# Patient Record
Sex: FEMALE | Employment: PART TIME | URBAN - METROPOLITAN AREA
[De-identification: names, ages, dates, MRNs, and addresses within clinical notes are randomized per-mention and may not be internally consistent; named-entity substitution may affect disease eponyms.]

---

## 2019-10-02 ENCOUNTER — OFFICE VISIT (OUTPATIENT)
Dept: PODIATRY | Facility: CLINIC | Age: 59
End: 2019-10-02
Payer: COMMERCIAL

## 2019-10-02 VITALS
SYSTOLIC BLOOD PRESSURE: 156 MMHG | WEIGHT: 147 LBS | HEIGHT: 63 IN | DIASTOLIC BLOOD PRESSURE: 95 MMHG | BODY MASS INDEX: 26.05 KG/M2 | HEART RATE: 68 BPM

## 2019-10-02 DIAGNOSIS — L84 CALLUS OF FOOT: Primary | ICD-10-CM

## 2019-10-02 DIAGNOSIS — M25.572 SINUS TARSI SYNDROME, LEFT: ICD-10-CM

## 2019-10-02 PROCEDURE — 99203 OFFICE O/P NEW LOW 30 MIN: CPT | Mod: 25 | Performed by: PODIATRIST

## 2019-10-02 PROCEDURE — 11055 PARING/CUTG B9 HYPRKER LES 1: CPT | Performed by: PODIATRIST

## 2019-10-02 RX ORDER — CHLORTHALIDONE 25 MG/1
25 TABLET ORAL EVERY MORNING
Refills: 3 | COMMUNITY
Start: 2019-07-10

## 2019-10-02 RX ORDER — CHLORAL HYDRATE 500 MG
2000 CAPSULE ORAL
COMMUNITY
Start: 2017-05-01

## 2019-10-02 RX ORDER — ATORVASTATIN CALCIUM 40 MG/1
40 TABLET, FILM COATED ORAL AT BEDTIME
Refills: 1 | COMMUNITY
Start: 2019-07-10

## 2019-10-02 RX ORDER — IBUPROFEN 200 MG
200 TABLET ORAL
COMMUNITY
Start: 2017-04-18

## 2019-10-02 RX ORDER — ATENOLOL 100 MG/1
100 TABLET ORAL DAILY
COMMUNITY

## 2019-10-02 RX ORDER — ASPIRIN 81 MG/1
81 TABLET ORAL
COMMUNITY
Start: 2015-04-22

## 2019-10-02 ASSESSMENT — MIFFLIN-ST. JEOR: SCORE: 1215.92

## 2019-10-02 NOTE — PATIENT INSTRUCTIONS
Soles  Silicone Shoe Inserts (Pair) Orthopedic Support for Walking, Running, and Fitness  Soft, Comfortable, Hypoallergenic Design

## 2019-10-02 NOTE — NURSING NOTE
"Chief Complaint   Patient presents with     Consult     Bl feet and ankle pain, \"started in right foot, main problem area\"        Initial BP (!) 156/95   Pulse 68   Ht 1.6 m (5' 3\")   Wt 66.7 kg (147 lb)   BMI 26.04 kg/m   Estimated body mass index is 26.04 kg/m  as calculated from the following:    Height as of this encounter: 1.6 m (5' 3\").    Weight as of this encounter: 66.7 kg (147 lb).  Medications and allergies reviewed.      Gabriella BARROSO MA    "

## 2019-10-02 NOTE — LETTER
10/2/2019         RE: Shanita Frazier  [address]  [place] [address]        Dear Colleague,    Thank you for referring your patient, Shanita Frazier, to the [facility]. Please see a copy of my visit note below.    PATIENT HISTORY:  Shanita Frazier is a 59 year old female who presents to clinic for a painful right and left foot .  The patient describes the pain as sharp aching over the outside of the left arch.  The patient relates the pain level is moderate.  The patient relates a painful callus on the bottom of the right foot.  The patient relates the pain has been present for the past several years.  The patient relates pain with ambulation.  The patient has tried cutting the callus out with little relief.       REVIEW OF SYSTEMS:  Constitutional, HEENT, cardiovascular, pulmonary, GI, , musculoskeletal, neuro, skin, endocrine and psych systems are negative, except as otherwise noted.     PAST MEDICAL HISTORY: History reviewed. No pertinent past medical history.     PAST SURGICAL HISTORY: History reviewed. No pertinent surgical history.     MEDICATIONS:   Current Outpatient Medications:      aspirin 81 MG EC tablet, Take 81 mg by mouth, Disp: , Rfl:      atenolol (TENORMIN) 100 MG tablet, Take 100 mg by mouth daily, Disp: , Rfl:      atorvastatin (LIPITOR) 40 MG tablet, Take 40 mg by mouth At Bedtime, Disp: , Rfl: 1     chlorthalidone (HYGROTON) 25 MG tablet, Take 25 mg by mouth every morning, Disp: , Rfl: 3     ibuprofen (ADVIL/MOTRIN) 200 MG tablet, Take 200 mg by mouth, Disp: , Rfl:      Omega-3 1000 MG capsule, Take 2,000 mg by mouth, Disp: , Rfl:      order for DME, Trilok Ankle Brace, Disp: 1 Device, Rfl: 0     ALLERGIES:    Allergies   Allergen Reactions     Fenofibrate Muscle Pain (Myalgia)     Varenicline Diarrhea        SOCIAL HISTORY:   Social History     Socioeconomic History     Marital status:      Spouse name: Not on file     Number of children: Not on file  "    Years of education: Not on file     Highest education level: Not on file   Occupational History     Not on file   Social Needs     Financial resource strain: Not on file     Food insecurity:     Worry: Not on file     Inability: Not on file     Transportation needs:     Medical: Not on file     Non-medical: Not on file   Tobacco Use     Smoking status: Current Some Day Smoker     Packs/day: 0.00     Years: 20.00     Pack years: 0.00     Types: Cigarettes     Smokeless tobacco: Never Used     Tobacco comment: occasional   Substance and Sexual Activity     Alcohol use: Not on file     Drug use: Not on file     Sexual activity: Not on file   Lifestyle     Physical activity:     Days per week: Not on file     Minutes per session: Not on file     Stress: Not on file   Relationships     Social connections:     Talks on phone: Not on file     Gets together: Not on file     Attends [employer]oration[employer] service: Not on file     Active member of club or organization: Not on file     Attends meetings of clubs or organizations: Not on file     Relationship status: Not on file     Intimate partner violence:     Fear of current or ex partner: Not on file     Emotionally abused: Not on file     Physically abused: Not on file     Forced sexual activity: Not on file   Other Topics Concern     Not on file   Social History Narrative     Not on file        FAMILY HISTORY: History reviewed. No pertinent family history.     EXAM:Vitals: BP (!) 156/95   Pulse 68   Ht 1.6 m (5' 3\")   Wt 66.7 kg (147 lb)   BMI 26.04 kg/m     BMI= Body mass index is 26.04 kg/m .    Weight management plan: Patient was referred to their PCP to discuss a diet and exercise plan.    General appearance: Patient is alert and fully cooperative with history & exam.  No sign of distress is noted during the visit.     Psychiatric: Affect is pleasant & appropriate.  Patient appears motivated to improve health.     Respiratory: Breathing is regular & unlabored while " sitting.     HEENT: Hearing is intact to spoken word.  Speech is clear.  No gross evidence of visual impairment that would impact ambulation.     Dermatologic: Skin is intact to right and left lower extremities without significant lesions, rash or abrasion.  No paronychia or evidence of soft tissue infection is noted.  On notes a hyperkeratotic skin lesion on the plantar aspect of the second metatarsal head on the right foot.  No surrounding erythema or subdermal hemorrhage noted.     Vascular: DP & PT pulses are intact & regular on the right and left.  No significant edema or varicosities noted.  CFT and skin temperature is normal to the right and left lower extremities.     Neurologic: Lower extremity sensation is intact to light touch.  No evidence of weakness or contracture in the right and left lower extremities.  No evidence of neuropathy.     Musculoskeletal: Patient is ambulatory without assistive device or brace.  No gross ankle deformity noted.  No foot or ankle joint effusion is noted.   Noted pain on palpation of the sinus tarsi on the left foot.  One notes moderate edema over the area.  No erythema noted.         ASSESSMENT / PLAN:     ICD-10-CM    1. Callus of foot L84 TRIM HYPERKERATOTIC SKIN LESION, ONE    right foot   2. 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  We discussed the nature of the condition as well as the treatment plan and expected length of recovery.  The patient was advised to wear a very thick silicone shoe insert to better offload the pressure causing the callus formation.  At this time, the hyperkeratotic skin lesion was sharply debrided with a #10 blade down to healthy epidermis.  One notes scar tissue formation underlying the callus formation.       Shanita verbalized agreement with and understanding of the rational for the diagnosis and treatment plan.  All questions were answered to best of my ability and the  patient's satisfaction. The patient was advised to contact the clinic with any questions that may arise after the clinic visit.      Disclaimer: This note consists of symbols derived from keyboarding, dictation and/or voice recognition software. As a result, there may be errors in the script that have gone undetected. Please consider this when interpreting information found in this chart.       MORENO Calhoun D.P.M., F.SARAH.C.F.A.S.        Again, thank you for allowing me to participate in the care of your patient.        Sincerely,        Jimmy Calhoun DPM